# Patient Record
Sex: FEMALE | Race: WHITE
[De-identification: names, ages, dates, MRNs, and addresses within clinical notes are randomized per-mention and may not be internally consistent; named-entity substitution may affect disease eponyms.]

---

## 2019-02-24 NOTE — UC
Respiratory Complaint HPI





- HPI Summary


HPI Summary: 





64 yo female ill x 3 weeks


cough


nasal congestion


post nasal drip


sinus pressure


now with sore throat and ear ache











- History of Current Complaint


Chief Complaint: UCRespiratory


Stated Complaint: SORE THROAT, HEADACHE


Time Seen by Provider: 02/24/19 12:26


Hx Obtained From: Patient


Hx Last Menstrual Period: n/a


Onset/Duration: Gradual Onset, Lasting Weeks


Timing: Constant


Severity Initially: Mild


Severity Currently: Moderate


Pain Intensity: 5


Pain Scale Used: 0-10 Numeric


Character: Cough: Nonproductive


Aggravating Factors: Nothing


Alleviating Factors: Nothing


Associated Signs And Symptoms: Positive: Nasal Congestion, Sinus Discomfort





- Allergies/Home Medications


Allergies/Adverse Reactions: 


 Allergies











Allergy/AdvReac Type Severity Reaction Status Date / Time


 


No Known Allergies Allergy   Verified 02/24/19 12:13














PMH/Surg Hx/FS Hx/Imm Hx


Previously Healthy: Yes





- Surgical History


Surgical History: Yes


Surgery Procedure, Year, and Place: TUBAL, TUBAL REVERSAL,PARTIAL THYROIDECTOMY,

BILATERAL CARPAL TUNNEL, RIGHT SHOULDER FOR ROTATOR CUFF TEAR, UTERINE ABLASION

, LEFT SHOULDER SURERY 2014.  "NERVE-BLOCK INJECTIONS" LOWER BACK x2, 2019





- Family History


Known Family History: Positive: Cardiac Disease, Hypertension, Other - CA





- Social History


Alcohol Use: Occasionally


Alcohol Amount: 1


Substance Use Type: None


Smoking Status (MU): Never Smoked Tobacco





- Immunization History


Most Recent Influenza Vaccination: FALL 2014





Review of Systems


All Other Systems Reviewed And Are Negative: Yes


Constitutional: Positive: Negative


Skin: Positive: Negative


Eyes: Positive: Negative


ENT: Positive: Sore Throat, Ear Ache, Nasal Discharge, Sinus Congestion, Sinus 

Pain/Tenderness


Respiratory: Positive: Cough


Cardiovascular: Positive: Negative


Gastrointestinal: Positive: Negative


Genitourinary: Positive: Negative


Motor: Positive: Negative


Neurovascular: Positive: Negative


Musculoskeletal: Positive: Negative


Neurological: Positive: Negative


Psychological: Positive: Negative





Physical Exam


Triage Information Reviewed: Yes


Appearance: Well-Appearing, No Pain Distress, Well-Nourished


Vital Signs: 


 Initial Vital Signs











Temp  99.2 F   02/24/19 12:14


 


Pulse  82   02/24/19 12:14


 


Resp  16   02/24/19 12:14


 


BP  124/86   02/24/19 12:14


 


Pulse Ox  98   02/24/19 12:14











Vital Signs Reviewed: Yes


Eyes: Positive: Conjunctiva Clear


ENT: Positive: Nasal congestion, TM bulging - R,  left is retracted, TM dull, 

Sinus tenderness, Uvula midline.  Negative: Hearing grossly normal, Nasal 

drainage, TMs normal, TM red, Tonsillar swelling, Tonsillar exudate, Trismus, 

Muffled voice


Dental: Negative: Abscess @


Neck: Positive: Supple, Nontender, No Lymphadenopathy


Respiratory: Positive: Lungs clear, Normal breath sounds, No respiratory 

distress, No accessory muscle use


Cardiovascular: Positive: RRR, No Murmur


Musculoskeletal: Positive: ROM Intact, No Edema


Neurological: Positive: Alert


Psychological Exam: Normal


Skin Exam: Normal





UC Diagnostic Evaluation





- Laboratory


O2 Sat by Pulse Oximetry: 98 - normal /not hypoxic





Respiratory Course/Dx





- Differential Dx/Diagnosis


Provider Diagnosis: 


 Sinusitis, Serous otitis media








Discharge





- Sign-Out/Discharge


Documenting (check all that apply): Patient Departure


All imaging exams completed and their final reports reviewed: No Studies





- Discharge Plan


Condition: Critical


Disposition: HOME


Prescriptions: 


Amoxicillin PO (*) [Amoxicillin 875 MG (*)] 875 mg PO BID #20 tab


Fluticasone NASAL SPRAY 50MCG* [Flonase NASAL SPRAY 50MCG*] 2 spray BOTH NARES 

BID #1 btl


Patient Education Materials:  Sinusitis (ED), Serous Otitis Media (ED)


Referrals: 


Javad Pathak MD [Primary Care Provider] - 5 Days (if not improved)





- Billing Disposition and Condition


Condition: CRITICAL


Disposition: Home

## 2019-04-20 NOTE — UC
UC General HPI





- HPI Summary


HPI Summary: 





pt tripped over her dog Monday of this week and fell on a door jam injury her R 

posterior pelvis area.


felt like a "crack" and a sharp pain shot out from area.


on celebrex and gets injections for chronic L low back pain.


no fever, abdominal pain, numb/weakness to arms leg or saddle anesthesia. no 

bowel/bladder dysfunction.





- History of Current Complaint


Chief Complaint: UCBackPain


Stated Complaint: FELL ON TAILBONE


Time Seen by Provider: 04/20/19 12:24


Hx Obtained From: Patient


Hx Last Menstrual Period: n/a


Onset/Duration: Sudden Onset


Timing: Constant


Pain Intensity: 6


Aggravating: movement


Associated Signs & Symptoms: Positive: Back Pain





- Allergy/Home Medications


Allergies/Adverse Reactions: 


 Allergies











Allergy/AdvReac Type Severity Reaction Status Date / Time


 


No Known Allergies Allergy   Verified 04/20/19 12:25














PMH/Surg Hx/FS Hx/Imm Hx





- Additional Past Medical History


Additional PMH: 





chronic back pain





- Surgical History


Surgical History: Yes


Surgery Procedure, Year, and Place: TUBAL, TUBAL REVERSAL,PARTIAL THYROIDECTOMY,

BILATERAL CARPAL TUNNEL, RIGHT SHOULDER FOR ROTATOR CUFF TEAR, UTERINE ABLASION

, LEFT SHOULDER SURERY 2014.  "NERVE-BLOCK INJECTIONS" LOWER BACK x2, 2019





- Family History


Known Family History: Positive: Cardiac Disease, Hypertension, Other - CA





- Social History


Occupation: Employed Full-time


Alcohol Use: Occasionally


Alcohol Amount: 1


Substance Use Type: None


Smoking Status (MU): Never Smoked Tobacco





- Immunization History


Most Recent Influenza Vaccination: FALL 2014


Vaccination Up to Date: Yes





Review of Systems


All Other Systems Reviewed And Are Negative: Yes


Constitutional: Negative: Fever


Gastrointestinal: Negative: Abdominal Pain


Motor: Negative: Decreased ROM


Neurological: Negative: Weakness





Physical Exam


Triage Information Reviewed: Yes


Appearance: Well-Appearing


Vital Signs: 


 Initial Vital Signs











Temp  98.7 F   04/20/19 12:25


 


Pulse  86   04/20/19 12:25


 


Resp  16   04/20/19 12:25


 


BP  131/85   04/20/19 12:25


 


Pulse Ox  98   04/20/19 12:25











Vital Signs Reviewed: Yes


Eyes: Positive: Conjunctiva Clear


Neck: Positive: Supple, Nontender, No Lymphadenopathy, Other: - c-spine non 

tender


Respiratory: Positive: Lungs clear


Cardiovascular: Positive: RRR


Abdomen Description: Positive: Nontender, No Organomegaly, Soft


Bowel Sounds: Positive: Present


Musculoskeletal: Positive: Other: - Back/pelvis=no gross deformity, swelling or 

bruising. Spine is non tender and ROM is intact. R S.I. joint and surrounding 

area is tender but no instability. Rest of pelvis is non tender. S/V/M intact 

x4. Steady gait. No saddle anesthesia.


Neurological: Positive: Alert


Psychological: Positive: Age Appropriate Behavior


Skin Exam: Normal





Diagnostics





- Radiology


  ** No standard instances


Radiology Interpretation Completed By: Radiologist - Sacroiliac joint is 

otherwise unremarkable.





Course/Dx





- Differential Dx - Multi-Symptom


Differential Diagnoses: Other - no concern for infection, acute abdomen or 

cauda equina. no fx on xray.





- Diagnoses


Provider Diagnosis: 


 Sacro-iliac pain








Discharge





- Sign-Out/Discharge


Documenting (check all that apply): Patient Departure


All imaging exams completed and their final reports reviewed: Yes





- Discharge Plan


Condition: Stable


Disposition: HOME


Prescriptions: 


Cyclobenzaprine TAB* [Flexeril 10 MG TAB*] 10 mg PO TID PRN #10 tab


 PRN Reason: Pain - Back


Patient Education Materials:  Sacroiliitis (ED)


Forms:  *Work Release


Referrals: 


Jose Enrique Ortega MD [Medical Doctor] - 


Additional Instructions: 


continue the Celebrex as directed.





Follow up with your pain doctor if not better in 5 days or sooner if worse.





- Billing Disposition and Condition


Condition: STABLE


Disposition: Home





- Attestation Statements


Provider Attestation: 





Per institutional requirements, I have reviewed the chart, however, I was not 

consulted specifically or made aware of this patient by the  midlevel provider.

  I did not personally evaluate, interact with , or disposition  this patient.

## 2019-11-22 ENCOUNTER — HOSPITAL ENCOUNTER (EMERGENCY)
Dept: HOSPITAL 25 - UCCORT | Age: 64
Discharge: HOME | End: 2019-11-22
Payer: COMMERCIAL

## 2019-11-22 VITALS — SYSTOLIC BLOOD PRESSURE: 121 MMHG | DIASTOLIC BLOOD PRESSURE: 74 MMHG

## 2019-11-22 DIAGNOSIS — R09.82: ICD-10-CM

## 2019-11-22 DIAGNOSIS — R05: ICD-10-CM

## 2019-11-22 DIAGNOSIS — Z87.891: ICD-10-CM

## 2019-11-22 DIAGNOSIS — H92.09: ICD-10-CM

## 2019-11-22 DIAGNOSIS — J32.9: Primary | ICD-10-CM

## 2019-11-22 DIAGNOSIS — J02.9: ICD-10-CM

## 2019-11-22 PROCEDURE — G0463 HOSPITAL OUTPT CLINIC VISIT: HCPCS

## 2019-11-22 PROCEDURE — 99212 OFFICE O/P EST SF 10 MIN: CPT

## 2019-11-22 NOTE — UC
Throat Pain/Nasal Johan HPI





- HPI Summary


HPI Summary: 


Patient is a 65yo female presenting with cold symptoms x3 weeks. Notes nasal 

congestion, "phlegm in throat," productive cough, and laryngitis. States 

symptoms wax and wane. Denies SOB and wheezing. Denies fever and chills. Denies 

nausea and vomiting. States she has taken mucines without much relief. Denies 

smoking. Denies h/o asthma or COPD. 








- History of Current Complaint


Chief Complaint: UCRespiratory


Stated Complaint: SORE THROAT


Hx Obtained From: Patient


Hx Last Menstrual Period: n/a


Onset/Duration: Lasting Weeks


Severity: Mild


Pain Intensity: 2


Pain Scale Used: 0-10 Numeric





- Allergies/Home Medications


Allergies/Adverse Reactions: 


 Allergies











Allergy/AdvReac Type Severity Reaction Status Date / Time


 


No Known Allergies Allergy   Verified 11/22/19 11:46











Home Medications: 


 Home Medications





Ascorbic Acid TAB* [Vitamin C  TAB*] 500 mg PO DAILY 11/22/19 [History 

Confirmed 11/22/19]











PMH/Surg Hx/FS Hx/Imm Hx


Endocrine History: Hypothyroidism





- Surgical History


Surgical History: Yes


Surgery Procedure, Year, and Place: TUBAL, TUBAL REVERSAL,PARTIAL THYROIDECTOMY,

BILATERAL CARPAL TUNNEL, RIGHT SHOULDER FOR ROTATOR CUFF TEAR, UTERINE ABLASION

, LEFT SHOULDER SURERY 2014.  "NERVE-BLOCK INJECTIONS" LOWER BACK x2, 2019





- Family History


Known Family History: Positive: Cardiac Disease, Hypertension, Other - [place]





- Social History


Alcohol Use: Weekly


Alcohol Amount: 1 drink/week


Substance Use Type: None


Smoking Status (MU): Former Smoker


Length of Time of Smoking/Using Tobacco: "in the high school days"





- Immunization History


Most Recent Influenza Vaccination: FALL 2014


Vaccination Up to Date: Yes





Review of Systems


All Other Systems Reviewed And Are Negative: Yes


Constitutional: Positive: Negative.  Negative: Fever, Chills


ENT: Positive: Sore Throat, Ear Ache, Nasal Discharge - PND, Sinus Congestion, 

Sinus Pain/Tenderness


Respiratory: Positive: Cough - productive.  Negative: Shortness Of Breath


Cardiovascular: Positive: Negative


Gastrointestinal: Positive: Negative


Musculoskeletal: Positive: Negative


Neurological: Positive: Negative





Physical Exam


Triage Information Reviewed: Yes


Appearance: Well-Appearing, No Pain Distress, Well-Nourished


Vital Signs: 


 Initial Vital Signs











Temp  98.5 F   11/22/19 11:45


 


Pulse  88   11/22/19 11:45


 


Resp  16   11/22/19 11:45


 


BP  121/74   11/22/19 11:45


 


Pulse Ox  100   11/22/19 11:45











Vital Signs Reviewed: Yes


Eyes: Positive: Conjunctiva Clear


ENT: Positive: Hearing grossly normal, Pharynx normal, Nasal congestion, Nasal 

drainage - PND, TMs normal, Sinus tenderness - frontal, Uvula midline.  Negative

: Tonsillar swelling, Tonsillar exudate


Neck exam: Normal


Neck: Positive: Supple, Nontender, No Lymphadenopathy


Respiratory Exam: Normal


Respiratory: Positive: Lungs clear, Normal breath sounds, No respiratory 

distress


Cardiovascular Exam: Normal


Cardiovascular: Positive: RRR


Neurological: Positive: Alert


Psychological: Positive: Age Appropriate Behavior





Throat Pain/Nasal Course/Dx





- Course


Course Of Treatment: 


Treated patient with augmentin and flonase for sinusitis and instructed to 

continue with symptomatic treatment. Instructed to follow up with PCP if 

symptoms persist.  Patient was understanding and agreed with treatment plan.








- Differential Dx/Diagnosis


Provider Diagnosis: 


 Sinusitis, Post-nasal drip








Discharge ED





- Sign-Out/Discharge


Documenting (check all that apply): Patient Departure


All imaging exams completed and their final reports reviewed: No Studies





- Discharge Plan


Condition: Stable


Disposition: HOME


Prescriptions: 


Amoxicillin/Clavulanate TAB* [Augmentin *] 875 mg PO BID #14 tab


Fluticasone NASAL SPRAY 50MCG* [Flonase NASAL SPRAY 50MCG*] 2 spray BOTH NARES 

DAILY PRN #1 btl


 PRN Reason: Congestion


Patient Education Materials:  Sinusitis (ED), Postnasal Drip (DC)


Referrals: 


Esdras Russell MD [Primary Care Provider] - If Needed


Additional Instructions: 


As discussed, take Augmentin for the treatment of your sinusitis. 


You may also take Mucinex as prescribed to help reduce mucus production.


You may use the Flonase nasal spray as directed for symptomatic relief.


Get plenty of rest and fluids.


Follow up with your primary care doctor if your symptoms do not resolve within 

7 days.








- Billing Disposition and Condition


Condition: STABLE


Disposition: Home

## 2020-02-09 ENCOUNTER — HOSPITAL ENCOUNTER (EMERGENCY)
Dept: HOSPITAL 25 - UCCORT | Age: 65
Discharge: HOME | End: 2020-02-09
Payer: COMMERCIAL

## 2020-02-09 VITALS — DIASTOLIC BLOOD PRESSURE: 83 MMHG | SYSTOLIC BLOOD PRESSURE: 138 MMHG
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
## 2020-02-09 DIAGNOSIS — Z87.891: ICD-10-CM
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
## 2020-02-09 DIAGNOSIS — M79.89: ICD-10-CM

## 2020-02-09 DIAGNOSIS — S46.911A: Primary | ICD-10-CM

## 2020-02-09 PROCEDURE — 99211 OFF/OP EST MAY X REQ PHY/QHP: CPT

## 2020-02-09 PROCEDURE — G0463 HOSPITAL OUTPT CLINIC VISIT: HCPCS

## 2020-02-09 NOTE — UC
Shoulder Pain HPI





- HPI Summary


HPI Summary: 





65 y/o female presents to the urgent care c/o RT shoulder pain s/p pulling 

something at work while shoveling Friday 2/7/2020.   Pt states she felt like 

something pulled in her RT shoulder.  Pain is worse w/ raising her arm and she 

is RT hand dominant. Pain at rest is dull 3/10 and 6/10 w/ certain movement. Pt 

denies numbness or tingling sensation over the RT arm.  She has taken Tylenol 

PM to alleviate symptoms.  She has Hx of arthritis and RT shoulder rotator cuff 

surgery many years ago.  Pt denies fever, SOB, chest pain, weakness, neck pain, 

abdominal pain, N/V/D. 








- History of Current Complaint


Chief Complaint: UCUpperExtremity


Stated Complaint: WC- RT SHOULDER COMPLAINT


Time Seen by Provider: 02/09/20 13:30


Hx Obtained From: Patient


Hx Last Menstrual Period: n/a


Onset/Duration: Gradual Onset, Lasting Days - 2 days, Still Present, Worse 

Since - today


Timing: Constant


Severity Initially: Mild


Severity Currently: Moderate


Location Of Pain: Is Discrete @ - RT shoulder, Radiates To - RT upper arm


Pain Intensity: 3 - at rest and 6/10 w/ movment


Pain Scale Used: 0-10 Numeric


Character: Sharp


Aggravating Factor(s): Lifting, Extension, Abduction


Alleviating Factor(s): Rest, Ice, OTC Meds


Associated Signs And Symptoms: Negative: Swelling, Redness, Bruising, Fever, 

Weakness, Numbness/Tingling


Related History: Dominant Hand Right





- Risk Factors


Non-Orthopedic Risk Factor: Negative


DVT Risk Factors: Negative





- Allergies/Home Medications


Allergies/Adverse Reactions: 


 Allergies











Allergy/AdvReac Type Severity Reaction Status Date / Time


 


No Known Allergies Allergy   Verified 02/09/20 13:23














PMH/Surg Hx/FS Hx/Imm Hx


Previously Healthy: Yes


Endocrine History: Hypothyroidism


Other Endocrine History: arthritis





- Surgical History


Surgical History: Yes


Surgery Procedure, Year, and Place: TUBAL, TUBAL REVERSAL,PARTIAL THYROIDECTOMY,

BILATERAL CARPAL TUNNEL, RIGHT SHOULDER FOR ROTATOR CUFF TEAR, UTERINE ABLASION

, LEFT SHOULDER SURERY 2014.  "NERVE-BLOCK INJECTIONS" LOWER BACK x2, 2019





- Family History


Known Family History: Positive: Cardiac Disease, Hypertension, Other - prostate 

and uterine CA





- Social History


Occupation: Employed Full-time


Lives: With Family


Alcohol Use: Weekly


Alcohol Amount: 1 per week


Substance Use Type: None


Smoking Status (MU): Former Smoker


Length of Time of Smoking/Using Tobacco: "in the high school days"





- Immunization History


Most Recent Influenza Vaccination: FALL 2014


Vaccination Up to Date: Yes





Review of Systems


All Other Systems Reviewed And Are Negative: Yes


Constitutional: Positive: Negative


Skin: Positive: Negative


Eyes: Positive: Negative


ENT: Positive: Negative


Respiratory: Positive: Negative


Cardiovascular: Positive: Negative


Gastrointestinal: Positive: Negative


Genitourinary: Positive: Negative


Motor: Positive: Negative


Neurovascular: Positive: Negative


Musculoskeletal: Positive: Decreased ROM - RT shoulder, Other: - RT shoulder 

pain s/p shoveling at work


Neurological/Mental Status: Positive: Negative


Psychological: Positive: Negative


Is Patient Immunocompromised?: No





Physical Exam





- Summary


Physical Exam Summary: 





Vital Signs Reviewed: Yes


GENERAL: Well-Appearing, No Pain Distress, Well-Nourished obese female w/o any 

apparent pain distress


Eyes: Positive: Conjunctiva Clear - PERRL,EOMI


ENT: Positive: Normal ENT inspection, Hearing grossly normal, Pharyngeal 

erythema - mild, Nasal drainage - clear, Uvula midline


Neck: Positive: Supple, Nontender, No Lymphadenopathy


Respiratory: Positive: Chest non-tender, Lungs clear, Normal breath sounds, No 

respiratory distress


Cardiovascular: Positive: RRR, No Murmur, Pulses Normal, Brisk Capillary Refill


Abdomen Description: Positive: Nontender, No Organomegaly, Soft. Negative: CVA 

Tenderness (R), CVA Tenderness (L)


Bowel Sounds: Positive: Present


Musculoskeletal:RT shoulder: The R shoulder is with/without obvious asymmetry 

or deformity when compared to the L shoulder. posterior shoulder w/ ecchymosis 

and bruising, no crepitus. No bony deformity or prominence of humeral head. No 

erythema, warmth. No Point Tenderness to palpation over the clavicle, or 

scapula. positive tenderness over Acromioclavicular joint and humeral head with 

mild swelling, NT to palpation of the bicipital groove . NT to palpation of the 

muscles of the sternocleidomastoid, pectoralis, biceps/triceps, deltoid, 

trapezius, . Limited ROM due to pain especially in adduction and abduction.on 

both passive and active, internal/external rotation, flexion/extension. "empty 

can and drop arm test unable to perform due to pain. No axillary tenderness 

or lymphadenopathy. Normal sensation over the deltoid and fingers. Distal motor 

and neurovascular status is intact.


Neurological Exam: Normal


Psychological Exam: Normal


Skin Exam: Normal














Triage Information Reviewed: Yes


Vital Signs: 


 Initial Vital Signs











Temp  98.8 F   02/09/20 13:24


 


Pulse  75   02/09/20 13:24


 


Resp  16   02/09/20 13:24


 


BP  138/83   02/09/20 13:24


 


Pulse Ox  98   02/09/20 13:24














Shoulder Course/Dx





- Course


Course Of Treatment: 


65 y/o female presents to the urgent care c/o RT shoulder pain s/p pulling 

something at work while shoveling Friday 2/7/2020.   Pt states she felt like 

something pulled in her RT shoulder.  Pain is worse w/ raising her arm and she 

is RT hand dominant. Pain at rest is dull 3/10 and 6/10 w/ certain movement. Pt 

denies numbness or tingling sensation over the RT arm.  She has taken Tylenol 

PM to alleviate symptoms.  She has Hx of arthritis and RT shoulder Rotator cuff 

surgery many years ago.  Pt denies fever, SOB, chest pain, weakness, neck pain, 

abdominal pain, N/V/D. Hx obtained. RTshoulder X-ray ordered: IMPRESSION: NO 

ACUTE OSSEOUS INJURY. MILD WIDENING OF THE AC INTERVAL WHICH MAY SUGGEST 

LIGAMENT INJURY OF UNCERTAIN ACUITY. IF SYMPTOMS PERSIST, RECOMMEND REPEAT 

IMAGING. Pt advised to continue to take Tylenol and Celebrex she has ar home to 

alleviate symptoms. Shoulder immobilized with a shoulder sling for 2-3 days. 

Advised to f/u with her Orthopedic or  Orthopedic DR Swift referral for 

further evaluation and treatment on possible re injuring her  rotator cuff. D/c 

instructions explained. Pt understood and agreed w/ plan of care.














- Differential Dx/Diagnosis


Differential Diagnosis/HQI/PQRI: Arthritis, Contusion, Rotator Cuff Injury, 

Sprain, Strain, Tendonitis


Provider Diagnosis: 


 Strain of right shoulder








Discharge ED





- Sign-Out/Discharge


Documenting (check all that apply): Patient Departure - D/c home


All imaging exams completed and their final reports reviewed: Yes





- Discharge Plan


Condition: Stable


Disposition: HOME


Patient Education Materials:  Shoulder Sprain (ED)


Referrals: 


Esdras Russell MD [Primary Care Provider] - 3 Days


Jesse Swift MD [Medical Doctor] - 3 Days


Additional Instructions: 


1-Please continue taking Tylenol PO and Celebrex as directed to alleviate pain 

and swelling.


2-Please apply ice, keep your shoulder immobilized with the shoulder sling for 3

-4 days and then resume movement slowly


3- Please f/u with your  Orthopedic or Orthopedic Dr Swift  in 3 days if not 

improvement of symptoms for further evaluation and treatment.














- Billing Disposition and Condition


Condition: STABLE


Disposition: Home